# Patient Record
Sex: FEMALE | Race: WHITE
[De-identification: names, ages, dates, MRNs, and addresses within clinical notes are randomized per-mention and may not be internally consistent; named-entity substitution may affect disease eponyms.]

---

## 2019-04-25 ENCOUNTER — HOSPITAL ENCOUNTER (OUTPATIENT)
Dept: HOSPITAL 84 - D.RAD | Age: 79
Discharge: HOME | End: 2019-04-25
Attending: ORTHOPAEDIC SURGERY
Payer: MEDICARE

## 2019-04-25 DIAGNOSIS — M16.11: Primary | ICD-10-CM

## 2019-04-29 ENCOUNTER — HOSPITAL ENCOUNTER (OUTPATIENT)
Dept: HOSPITAL 84 - D.RAD | Age: 79
Discharge: HOME | End: 2019-04-29
Attending: ORTHOPAEDIC SURGERY
Payer: MEDICARE

## 2019-04-29 DIAGNOSIS — M54.16: Primary | ICD-10-CM

## 2019-08-02 ENCOUNTER — HOSPITAL ENCOUNTER (OUTPATIENT)
Dept: HOSPITAL 84 - D.SP | Age: 79
Discharge: HOME | End: 2019-08-02
Attending: ORTHOPAEDIC SURGERY
Payer: MEDICARE

## 2019-08-02 DIAGNOSIS — Z01.812: ICD-10-CM

## 2019-08-02 DIAGNOSIS — M16.11: Primary | ICD-10-CM

## 2019-09-24 ENCOUNTER — HOSPITAL ENCOUNTER (OUTPATIENT)
Dept: HOSPITAL 84 - D.LAB | Age: 79
Discharge: HOME | End: 2019-09-24
Attending: FAMILY MEDICINE
Payer: MEDICARE

## 2019-09-24 DIAGNOSIS — D64.9: Primary | ICD-10-CM

## 2019-09-24 DIAGNOSIS — R12: ICD-10-CM

## 2019-09-24 LAB
FERRITIN SERPL-MCNC: 67 NG/ML (ref 3–244)
IRON SERPL-MCNC: 65 UG/DL (ref 35–150)
SAO2 % BLD FROM PO2: 22 % (ref 15–55)
TIBC SERPL-MCNC: 291 UG/DL (ref 260–445)
UIBC SERPL-MCNC: 226 UG/DL (ref 150–375)

## 2020-07-15 ENCOUNTER — HOSPITAL ENCOUNTER (OUTPATIENT)
Dept: HOSPITAL 84 - D.CT | Age: 80
Discharge: HOME | End: 2020-07-15
Attending: FAMILY MEDICINE
Payer: MEDICARE

## 2020-07-15 DIAGNOSIS — I65.23: Primary | ICD-10-CM

## 2020-09-02 ENCOUNTER — HOSPITAL ENCOUNTER (OUTPATIENT)
Dept: HOSPITAL 84 - D.US | Age: 80
Discharge: HOME | End: 2020-09-02
Attending: RADIOLOGY
Payer: MEDICARE

## 2020-09-02 DIAGNOSIS — R42: Primary | ICD-10-CM

## 2020-09-02 DIAGNOSIS — I65.01: ICD-10-CM

## 2020-09-02 DIAGNOSIS — I65.23: ICD-10-CM

## 2020-09-21 ENCOUNTER — HOSPITAL ENCOUNTER (OUTPATIENT)
Dept: HOSPITAL 84 - D.SP | Age: 80
Discharge: HOME | End: 2020-09-21
Attending: RADIOLOGY
Payer: MEDICARE

## 2020-09-21 VITALS
WEIGHT: 160.34 LBS | WEIGHT: 160.34 LBS | HEIGHT: 66 IN | HEIGHT: 66 IN | BODY MASS INDEX: 25.77 KG/M2 | BODY MASS INDEX: 25.77 KG/M2

## 2020-09-21 DIAGNOSIS — G45.8: Primary | ICD-10-CM

## 2020-09-21 LAB
ANION GAP SERPL CALC-SCNC: 11.3 MMOL/L (ref 8–16)
APTT BLD: 25.8 SECONDS (ref 22.8–39.4)
BASOPHILS NFR BLD AUTO: 0.4 % (ref 0–2)
BUN SERPL-MCNC: 17 MG/DL (ref 7–18)
CALCIUM SERPL-MCNC: 9.4 MG/DL (ref 8.5–10.1)
CHLORIDE SERPL-SCNC: 103 MMOL/L (ref 98–107)
CO2 SERPL-SCNC: 28 MMOL/L (ref 21–32)
CREAT SERPL-MCNC: 0.9 MG/DL (ref 0.6–1.3)
EOSINOPHIL NFR BLD: 1.3 % (ref 0–7)
ERYTHROCYTE [DISTWIDTH] IN BLOOD BY AUTOMATED COUNT: 13.7 % (ref 11.5–14.5)
GLUCOSE SERPL-MCNC: 113 MG/DL (ref 74–106)
HCT VFR BLD CALC: 38.2 % (ref 36–48)
HGB BLD-MCNC: 12.4 G/DL (ref 12–16)
IMM GRANULOCYTES NFR BLD: 0.2 % (ref 0–5)
INR PPP: 0.96 (ref 0.85–1.17)
LYMPHOCYTES NFR BLD AUTO: 13.1 % (ref 15–50)
MCH RBC QN AUTO: 29.2 PG (ref 26–34)
MCHC RBC AUTO-ENTMCNC: 32.5 G/DL (ref 31–37)
MCV RBC: 90.1 FL (ref 80–100)
MONOCYTES NFR BLD: 7.9 % (ref 2–11)
NEUTROPHILS NFR BLD AUTO: 77.1 % (ref 40–80)
OSMOLALITY SERPL CALC.SUM OF ELEC: 278 MOSM/KG (ref 275–300)
PLATELET # BLD: 252 10X3/UL (ref 130–400)
PMV BLD AUTO: 9.2 FL (ref 7.4–10.4)
POTASSIUM SERPL-SCNC: 4.3 MMOL/L (ref 3.5–5.1)
PROTHROMBIN TIME: 12.7 SECONDS (ref 11.6–15)
RBC # BLD AUTO: 4.24 10X6/UL (ref 4–5.4)
SODIUM SERPL-SCNC: 138 MMOL/L (ref 136–145)
WBC # BLD AUTO: 9.7 10X3/UL (ref 4.8–10.8)

## 2020-09-21 NOTE — HEMODYNAMI
PATIENT:HONEY MADRIGAL                              MEDICAL RECORD: [id number]
: 36                                            LOCATION:[facility]           
ACCT# [id number]                                       ADMISSION DATE: 20
 
 
 Generatedon:202011:34
Patient name: HONEY MADRIGAL Patient #: [id number] Visit #: [id number] SSN: 
: 1940 Date of
study: 2020
Page: Of
Hemodynamic Procedure Report
****************************
Patient Data
Patient Demographics
Procedure consent was obtained
First Name: HONEY          Gender: Female
Last Name: KAITLIN           : 1940
Middle Initial: ALEXIS       Age: 79 year(s)
Patient #: [id number]       Race: Unknown
Visit #: [id number]
Accession #:
[id number]
Additional ID: [id number]
Contact details
Address: [address] [place] [address]   Phone: [phone]
State: [place]
City: [place]
Zip code: [address]
Past Medical History
Allergies: No known allergies
Admission
Admission Data
Admission Date: 2020   Admission Time: 6:20
Procedure
Procedure Types
Cath Procedure
Peripheral Cath Diagnostic Procedure
4-Vessel
4 Vessel Carotid Arterio Arch
Procedure Description
Procedure Date
Procedure Date: 2020
Procedure Start Time: 9:42
Procedure End Time: 11:33
Procedure Staff
Name                            Function
Alex Jimenez MD             Ordering physician
Tor Sparks MD                  Performing Physician
MATEUS MANRIQUEZ RT                  Monitor
Giorgio Palma RT              Scrub
Shakila Humphreys RN                Nurse
Vinicius ZHANG RN               Nurse
Procedure Data
Cath Procedure
Fluoroscopy
Diagnostic fluoroscopy      Total fluoroscopy Time:
time: 31.5 min              31.5 min
Contrast Material
Contrast Material Type                       Amount (ml)
 
Visipaque 270                                0
Isovue 300                                   245
Entry Location
Entry     Primary  Successful  Side  Size  Upsize Upsize Entry    Closure Succes
sful  Closure
Location                             (Fr)  1 (Fr) 2 (Fr) Remarks  Device        
      Remarks
Femoral                        Right 5 Fr  6 Fr   6 Fr            Exoseal
artery                                     Long   Short
Diagnostic catheters
Device Type               Used For           End Catheter
Placement
DIAGNOSTIC Pigtail 5Fr
catheter (200633F)
Procedure Medications
Medication           Administration Route Dosage
Lidocaine 1%         added to field       20
Heparin Flush Bag    added to field       3 bags
(1000units/500ml NS)
Versed               I.V.                 1 mg
Fentanyl             I.V.                 50 mcg
Versed               I.V.                 0.5 mg
Fentanyl             I.V.                 25 mcg
Heparin Bolus        I.V.                 5000 units
Versed               I.V.                 0.5 mg
Fentanyl             I.V.                 25 mcg
Hemodynamics
Rest
Heart Rate: 87 (bpm)
Snapshots
Pre Cath      Intra         NCS           Post Cath
Vital Signs
Time     Heart  Resp   SPO2 etCO2   NIBP (mmHg)  Rhythm  Pain Status   Sedation
Rate   (ipm)  (%)  (mmHg)                                     Level
(bpm)
9:13:25  85     27          35.5    157/61(90)   NSR     0 (11) , No   10(A)
pain
9:17:47  86     10     100  35.5    152/68(110)  NSR     0 (11) , No   10(A)
pain
9:22:07  84     12     100  33.2    151/69(114)  NSR     0 (11) , No   10(A)
pain
9:26:23  89     12     100  34      149/80(110)  NSR     0 (11) , No   10(A)
pain
9:30:44  87     14     100  34      151/71(112)  NSR     0 (11) , No   10(A)
pain
9:35:04  86     27     100  33.3    150/68(114)  NSR     0 (11) , No   10(A)
pain
9:39:18  87     14     100  38.5    156/92(128)  NSR     0 (11) , No   10(A)
pain
9:43:38  82     37     100  1.5     154/67(110)  NSR     2 (11) ,      9(A)
Uncomfortable
9:47:58  84     23     99   0.7     143/67(107)  NSR     1 (11) , Very 9(A)
mild
9:52:18  85     33     100  34.8    148/65(108)  NSR     0 (11) , No   9(A)
pain
9:56:38  85     14     98   35.5    152/68(107)  NSR     0 (11) , No   9(A)
pain
10:00:56 83     16     100  35.5    151/68(110)  NSR     0 (11) , No   9(A)
pain
10:05:16 83     26     100  35.5    148/70(107)  NSR     0 (11) , No   9(A)
 
pain
10:09:37 83     34     100  33.3    145/67(109)  NSR     0 (11) , No   9(A)
pain
10:13:57 84     11     100  24.2    134/69(99)   NSR     0 (11) , No   9(A)
pain
10:18:11 83     35     100  31      151/69(110)  NSR     0 (11) , No   9(A)
pain
10:22:31 84     31     100  13.6    160/71(108)  NSR     0 (11) , No   9(A)
pain
10:26:53 84     37     100  32.5    169/76(111)  NSR     0 (11) , No   9(A)
pain
10:31:19 84     30     100  29.5    153/74(119)  NSR     0 (11) , No   9(A)
pain
10:35:37 84     42     100  11.3    171/79(128)  NSR     0 (11) , No   9(A)
pain
10:40:05 83     16     99   25.7    165/67(109)  NSR     0 (11) , No   9(A)
pain
10:44:30 85     17     100  32.5    165/71(111)  NSR     0 (11) , No   9(A)
pain
10:48:56 86     18     100  27.2    164/71(113)  NSR     0 (11) , No   9(A)
pain
10:53:20 87     15     100  35.5    163/73(113)  NSR     0 (11) , No   9(A)
pain
10:57:45 85     17     100  28.7    165/73(120)  NSR     0 (11) , No   9(A)
pain
11:02:09 83     14     100  16.6    168/79(120)  NSR     0 (11) , No   9(A)
pain
11:06:35 84     16     99   0       184/76(130)  NSR     0 (11) , No   9(A)
pain
11:11:05 85     29     100  30.2    167/66(132)  NSR     0 (11) , No   9(A)
pain
11:15:28 86     18     100  32.5    156/79(118)  NSR     0 (11) , No   9(A)
pain
11:19:47 85     13     100  14.3    159/76(119)  NSR     0 (11) , No   9(A)
pain
11:24:08 84     15     100  0       171/83(127)  NSR     0 (11) , No   9(A)
pain
11:28:30 86     11     100  34      160/107(129) NSR     0 (11) , No   9(A)
pain
11:32:29                    0       No Cuff      NSR     0 (11) , No   9(A)
pain
Medications
Time     Medication       Route  Dose  Verified Delivered Reason     Notes  Effe
ctiveness
by       by
9:40:56  Lidocaine 1%     added  20ml  Tor Lentz      for local
to     vial  Bridger Sparks MD anesthetic
field        MD
9:42:53  Heparin Flush    added  3     Tor Lentz      used for
Bag              to     bags  Bridger Sparks MD procedure
(1000units/500ml field        MD
NS)
9:43:07  Versed           I.V.   1 mg  Tor Jhaveri    for
Petr Sparks RN sedation
MD
9:43:19  Fentanyl         I.V.   50    Tor     Shakila    for
mcg   Petr Sparks RN sedation
MD
10:34:34 Versed           I.V.   0.5   Tor     Shakila    for
mg    Petr Sparks RN sedation
 
MD
10:34:43 Fentanyl         I.V.   25    Tor     Shakila    for
mcg   Petr Sparks RN sedation
MD
10:46:57 Heparin Bolus    I.V.   5000  Tor     Shakila
units Petr Sparks RN, MD
11:02:55 Versed           I.V.   0.5   Tor     Shkaila    for
mg    Petr Sparks RN sedation
MD
11:03:03 Fentanyl         I.V.   25    Tor     Shakila    for
mcg   Petr Sparks RN sedation
MD
Procedure Log
Time     Note
8:45:35  Giorgio Palma RT (R) (CV) sent for patient. Start room use.
8:45:36  Time tracking: Regular hours (M-F 7:00 - 5:00)
8:45:41  Plan of Care:Hemodynamics will remain stable., Cardiac rhythm will
remain stable., Comfort level will be maintained., Respiratory function
will remain adequate., Patient/ family verbilizes understanding of
procedure., Procedure tolerated without complication., Recovers from
procedure without complications..
8:45:47  Patient received from Outpatients to IR Alert and oriented. Tansferred
to table in Supine position.
8:45:49  Signed procedure consent form obtained from patient.
8:45:50  Warm blankets applied, and maco hugger turned on for patient comfort.
8:45:50  Correct patient and procedure confirmed by team.
8:45:51  ECG and BP/O2 sat monitors applied to patient.
8:45:51  -----------------------------------------------------------------------
-
8:45:55  H&P Date Dictated: 2020 H&P Addendum completed by physician on day
of procedure. (MUST COMPLETE FOR ALL OUTPATIENTS).
8:45:57  Pre-procedure instructions explained to patient.
8:45:58  Pre-op teaching completed and patient verbalized understanding.
8:46:00  Patient NPO since Midnight.
8:46:48  Patient allergic to No known allergies
8:46:51  Is the patient allergic to Iodine/contrast media? No.
8:46:52  Is patient on blood thinner?Yes, not taken x1 week
8:47:18  Patient diabetic? No.
8:47:22  -----------------------------------------------------------------------
-
8:47:23  ----Pre-sedation anethsthesia assessment.----
8:47:26  Previous problem with sedation/anesthesia? No ?
8:47:27  Snore? Yes
8:47:29  Sleep apnea? No
8:47:31  Deviated septum? No
8:47:32  Opens mouth fully? Yes
8:47:35  Sticks out tongue? Yes
8:47:38  Airway obstruction? No ?
8:47:43  Dentures? Yes in tight
8:48:08  IV patent on arrival in right wrist with 0.9% NaCl at [facility].
8:48:14  Right groin area was prepped with chlora-prep and draped in sterile
fashion
8:48:24  Pre procedure: right dorsailis pedis pulse Doppler
8:48:27  Pre procedure: right posterior tibial pulse Doppler
8:48:29  Alarms reviewed by FERNY BLACK
8:48:30  Sharps counted by scrub and verified by BLAYNE
8:48:33  -----------------------------------------------------------------------
-
8:48:37  Use device set IR Diagnostic
 
8:48:38  ACIST Syringe (84377) opened to sterile field.
8:48:38  ACIST Hand Control (06863) opened to sterile field.
8:48:39  ACIST Manifold (55972) opened to sterile field.
8:48:39  Bag Decanter (2002S) opened to sterile field.
8:48:39  Sterile Angiographic Pack opened to sterile field.
8:48:40  Tegaderm 4 x 4 (1626W) opened to sterile field.
8:49:00  TUBING High Pressure Extension Tubing (Allan) (AW8616G) opened to
sterile field.
8:49:01  PEPE 260 wire (R91961) opened to sterile field.
8:49:01  SHEATH 5FR Dillon (SQN680) opened to sterile field.
8:49:02  MICROPUNCTURE 4FR Cook (G68588) opened to sterile field.
8:49:10  DOC .035 wire (A10785) opened to sterile field.
9:11:52  A DIAGNOSTIC Pigtail 5Fr catheter (869136Y) opened to sterile field.
9:12:09  -----------------------------------------------------------------------
-
9:12:16  Vital chart was started
9:12:17  Baseline sample Acquired.
9:12:21  Full Disclosure recording started
9:12:23  -----------------------------------------------------------------------
-
9:27:13  GLIDE WIRE ANGLE 260cm (RQ3469) opened to sterile field.
9:27:13  TORQUE DEVICE PLASTIC .038 ( TD01) opened to sterile field.
9:36:00  Physician arrived
9:37:29  --------ALL STOP TIME OUT------
9:37:29  Final Timeout: patient, procedure, and site verified with staff and
physician. All members of the team are in agreement.
9:37:32  Right groin site verified by team.
9:37:35  Fire Safety Assessment: A--An alcohol-based skin anteseptic being used
preoperatively., C--Open oxygen or nitrous oxide is being used.
9:38:26  2) 60-89 Mildly reduced kidney function, and other findings (as for
stage 1) point to kidney disease.
9:38:49  Maximum allowable contrast dose (3.7 X eGFR X 0.75)177.6 ml.
9:38:54  Sedation plan: IV Moderate Sedation Medication:Versed, Fentanyl
9:40:04  Procedure started.
9:40:56  Lidocaine 1% 20ml vial added to field was administered by Tor Sparks MD; for local anesthetic; Verbal order read back and verified.
9:42:15  Local anesthetic to right femoral artery with Lidocaine 1% by Tor Sparks MD.**INITIAL ACCESS ONLY**
9:42:53  Heparin Flush Bag (1000units/500ml NS) 3 bags added to field was
administered by Tor Sparks MD; used for procedure; Verbal order read
back and verified.
9:43:07  Versed 1 mg I.V. was administered by Shakila Humphreys RN; for sedation;
Verbal order read back and verified.
9:43:19  Fentanyl 50 mcg I.V. was administered by Shakila Humphreys RN; for sedation
;
Verbal order read back and verified.
9:45:18  AMPLATZ Super Stiff 75cm wire ([id number]) opened to sterile field.
9:45:26  Access obtained with 4Fr micropunture.
9:45:49  A 5 Fr sheath was inserted into the Right Femoral artery
9:46:28  DOC wire advanced.
9:52:07  GLIDE CATHETER 5FR ANGLED 100cm () opened to sterile field.
10:30:11 Sheath upsized to a 6 Fr Long.
10:34:07 CXI Catheter 90cm (M95890) opened to sterile field.
10:34:34 Versed 0.5 mg I.V. was administered by Shakila Humphreys RN; for sedation;
Verbal order read back and verified.
10:34:43 Fentanyl 25 mcg I.V. was administered by Shakila Humphreys RN; for sedation
;
Verbal order read back and verified.
10:39:55 Cook RAABE 6FR. 90cm guide sheath opened to sterile field.
10:46:57 Heparin Bolus 5000 units I.V. was administered by Shakila Humphreys RN; ;
 
Verbal order read back and verified.
10:55:19 Inflate balloon Inflation number: 1 A Evercross 4 x 4 x 135 Balloon
([id number]) was prepped and advanced across the Undefined1 , then
inflated to 20 GISELL.
11:01:59 Place stent Inflation Number: 2 A Visipro 7 x 37 x 135 Stent
(XOD21-77-) was prepped and advanced across the Undefined1 . The
stent was deployed at 12 GISELL. Expiration date checked and verified.
11:02:55 Versed 0.5 mg I.V. was administered by Shakila Humphreys RN; for sedation;
Verbal order read back and verified.
11:03:03 Fentanyl 25 mcg I.V. was administered by Shakila Humphreys RN; for sedation
;
Verbal order read back and verified.
11:16:21 SHEATH 6FR Dillon (GOR472) opened to sterile field.
11:17:00 CXI SUPPORT .035 135 CM STR catheter (J04224) opened to sterile field.
11:25:04 Procedure ended.(Physican Out)
11:25:11 Sheath upsized to a 6 Fr Short.
11:25:11 Sheath removed intact; hemostasis achieved with Exoseal to the Right
Femoral artery.
11:31:01 Fluoroscopy time 31.50 minutes.
11:31:06 Dose Area Product 1116 mGy/cm.
11:31:30 Contrast amount:Visipaque 270 0ml.
11:31:52 Contrast amount:Isovue 300 245ml.
11:31:54 Sharps counted by scrub and verified by R.N.
11:31:59 Post Procedure Pulses reassessed and unchanged
11:32:06 Post right femoral artery:stable, soft, clean and dry
11:32:08 Vital chart was stopped
11:32:11 Operative report dictated upon procedure completion.
11:32:12 Post procedure instruction explained to patient.Patient verbalizes
understanding.
11:33:39 Patient transfered to Outpatients with Stretcher.
11:33:41 Procedure ended.
11:33:41 Full Disclosure recording stopped
Intervention Summary
Intervention Notes
Time     ActionType  Lesion and  Equipment Used    Action#  Pressure  Duration
Attributes
10:55:19 Inflate     Undefined1  Evercross 4 x 4 x 1        0         00:00
balloon                 135 Balloon
([id number])
11:01:59 Place stent Undefined1  Visipro 7 x 37 x  2        0         00:00
135 Stent
(SDM49-74-)
Device Usage
Item Name         Manufacture  Quantity  Catalog Number   Hospital Part    Curre
nt Minimal Lot# /
Charge   Number  Stock   Stock   Serial#
Code
ACIST Syringe     Acist        1         47537            431210   253717  15268
3  20
(49133)           Medical
Systems Inc
ACIST Hand        Acist        1         66795            636383   121109  34628
4  5
Control (74506)   Medical
Systems Inc
ACIST Manifold    Acist        1         93921            744196   936657  26004
0  5
(21525)           Medical
Systems Inc
Bag Decanter      Microtek     1         S            801285   41941   12741
 
2  5
(S)           Medical Inc.
Sterile           Cardinal     1         [id number]       141495           18220
9  5
Angiographic Pack Health
Tegaderm 4 x 4    3M           1         1626W            554746   849710  61674
6  5
(1626W)
TUBING High       Merit        1         SE0385R          629441   32514   08790
0  10
Pressure          Medical
Extension Tubing
(Allan) (KW8520I)
PEPE 260 wire    Cook Medical 1         Q15213           493461   84945   64485
7  5
(H98188)
SHEATH 5FR        Terumo       1         JUG233           305254   337892  58981
0  5
Dillon (JIJ914)
MICROPUNCTURE 4FR Cook Medical 1         J62734           533325   230016  40929
7  5
Cook (P61613)
DOC .035 wire     Cook Medical 1         L32040           706745           90524
4  5
(D62348)
DIAGNOSTIC        Cardinal     1         693477U          236280   627072  80081
6  5
Pigtail 5Fr       Health
catheter
(620664X)
GLIDE WIRE ANGLE  Terumo       1         SX8630           565910   167211  21531
5  5
260cm (LJ4223)
TORQUE DEVICE     Yolo       1         TD01             070896   774249  01735
9  5
PLASTIC .038 (    Scientific
TD01)
AMPLATZ Super     Yolo       1         [id number]       568569   716802  39061
5  5
Stiff 75cm wire   Scientific
([id number])
GLIDE CATHETER    Terumo       1                     107135   57651   00303
0  4
5FR ANGLED 100cm
()
CXI Catheter 90cm Cook Medical 1         A34635           600197   149755  08284
6  5
(U45055)
Cook RAABE 6FR.   Cook Medical 1         O11312           794491           31252
7  5
90cm guide sheath
Evercross 4 x 4 x Medtronic    1         [id number]    793349   338033  09344
7  5
135 Balloon
([id number])
Visipro 7 x 37 x  Medtronic    1         ICG52-69-  191613   198450  95761
7  5
135 Stent
(AMY81-49-)
SHEATH 6FR        Terumo       1         MYZ080           174736   428755  09360
 
1  40
Dillon (WDD382)
CXI SUPPORT .035  Cook [employer] 1         Z48154           175796   981930  09553
7  5
135 CM STR
catheter (D45144)
Signature Audit [place]
Stage           Time        Signature      Unsigned
Intra-Procedure 2020   MATEUS MANRIQUEZ RT
11:33:59 AM (R)
 
 
 
 
 
 
 
 
 
 
 
 
 
 
 
 
 
 
 
 
 
 
 
 
 
 
 
 
 
 
 
 
 
 
 
 
 
 
 
 
 
 
 
 
 
[facility]                                          
[address] [place] [address]

## 2020-09-21 NOTE — NUR
1139 PT RETURNING TO ROOM 2508 FROM RADIOLOGY VIA STRETCHER. VITAL
SIGNS RECORDED ON POST PROCEDURE FORM AND IN PAPER CHART.

## 2020-09-21 NOTE — NUR
1225 PT STATES SHE WILL NEED A BEDPAN SOON, ONE TOOK TO ROOM AND PT
WILL CALL WHEN SHE IS READY TO BE PLACED ON THE BEDPAN. PT IS EATING
AT THE PRESENT TIME A FINGER FOOD SANDWICH.

## 2020-10-16 ENCOUNTER — HOSPITAL ENCOUNTER (OUTPATIENT)
Dept: HOSPITAL 84 - D.MRI | Age: 80
Discharge: HOME | End: 2020-10-16
Attending: RADIOLOGY
Payer: MEDICARE

## 2020-10-16 VITALS — BODY MASS INDEX: 25.8 KG/M2

## 2020-10-16 DIAGNOSIS — R53.1: ICD-10-CM

## 2020-10-16 DIAGNOSIS — Z95.828: Primary | ICD-10-CM
